# Patient Record
Sex: FEMALE | Race: WHITE | NOT HISPANIC OR LATINO
[De-identification: names, ages, dates, MRNs, and addresses within clinical notes are randomized per-mention and may not be internally consistent; named-entity substitution may affect disease eponyms.]

---

## 2017-07-18 ENCOUNTER — RESULT REVIEW (OUTPATIENT)
Age: 32
End: 2017-07-18

## 2018-12-06 ENCOUNTER — RESULT REVIEW (OUTPATIENT)
Age: 33
End: 2018-12-06

## 2019-01-08 ENCOUNTER — RESULT REVIEW (OUTPATIENT)
Age: 34
End: 2019-01-08

## 2019-01-17 ENCOUNTER — OUTPATIENT (OUTPATIENT)
Dept: OUTPATIENT SERVICES | Facility: HOSPITAL | Age: 34
LOS: 1 days | End: 2019-01-17

## 2019-01-17 VITALS
DIASTOLIC BLOOD PRESSURE: 80 MMHG | SYSTOLIC BLOOD PRESSURE: 100 MMHG | HEIGHT: 69 IN | RESPIRATION RATE: 16 BRPM | WEIGHT: 160.06 LBS | TEMPERATURE: 98 F | HEART RATE: 82 BPM

## 2019-01-17 DIAGNOSIS — R87.613 HIGH GRADE SQUAMOUS INTRAEPITHELIAL LESION ON CYTOLOGIC SMEAR OF CERVIX (HGSIL): ICD-10-CM

## 2019-01-17 DIAGNOSIS — Z98.890 OTHER SPECIFIED POSTPROCEDURAL STATES: Chronic | ICD-10-CM

## 2019-01-17 DIAGNOSIS — R87.619 UNSPECIFIED ABNORMAL CYTOLOGICAL FINDINGS IN SPECIMENS FROM CERVIX UTERI: ICD-10-CM

## 2019-01-17 LAB
HCG SERPL-ACNC: < 5 MIU/ML — SIGNIFICANT CHANGE UP
HCT VFR BLD CALC: 43.5 % — SIGNIFICANT CHANGE UP (ref 34.5–45)
HGB BLD-MCNC: 13.8 G/DL — SIGNIFICANT CHANGE UP (ref 11.5–15.5)
MCHC RBC-ENTMCNC: 30.3 PG — SIGNIFICANT CHANGE UP (ref 27–34)
MCHC RBC-ENTMCNC: 31.7 % — LOW (ref 32–36)
MCV RBC AUTO: 95.4 FL — SIGNIFICANT CHANGE UP (ref 80–100)
NRBC # FLD: 0 K/UL — LOW (ref 25–125)
PLATELET # BLD AUTO: 293 K/UL — SIGNIFICANT CHANGE UP (ref 150–400)
PMV BLD: 10.3 FL — SIGNIFICANT CHANGE UP (ref 7–13)
RBC # BLD: 4.56 M/UL — SIGNIFICANT CHANGE UP (ref 3.8–5.2)
RBC # FLD: 12.2 % — SIGNIFICANT CHANGE UP (ref 10.3–14.5)
WBC # BLD: 7.48 K/UL — SIGNIFICANT CHANGE UP (ref 3.8–10.5)
WBC # FLD AUTO: 7.48 K/UL — SIGNIFICANT CHANGE UP (ref 3.8–10.5)

## 2019-01-17 NOTE — H&P PST ADULT - NSANTHOSAYNRD_GEN_A_CORE
No. VICKI screening performed.  STOP BANG Legend: 0-2 = LOW Risk; 3-4 = INTERMEDIATE Risk; 5-8 = HIGH Risk

## 2019-01-17 NOTE — H&P PST ADULT - HISTORY OF PRESENT ILLNESS
Pt is a 33 y.o. female ; pt reports h/o abnormal pap  smear first noted at 17 years old. pt reports recent pap smear Pt is a 33 y.o. female ; pt reports recent pap smear " abnormal " Pt s/p Colposcopy ; pt now presents for Leep

## 2019-01-17 NOTE — H&P PST ADULT - PSH
(normal spontaneous vaginal delivery)  x2 3/02/11, 12  S/P D&C (status post dilation and curettage)  2010

## 2019-01-17 NOTE — H&P PST ADULT - PROBLEM SELECTOR PLAN 1
Leep    Pre op instructions including Pepcid reviewed with pt ; pt appears to have a good understanding of pre op instructions    Cup provided for UCG dos

## 2019-01-29 ENCOUNTER — TRANSCRIPTION ENCOUNTER (OUTPATIENT)
Age: 34
End: 2019-01-29

## 2019-01-30 ENCOUNTER — RESULT REVIEW (OUTPATIENT)
Age: 34
End: 2019-01-30

## 2019-01-30 ENCOUNTER — OUTPATIENT (OUTPATIENT)
Dept: OUTPATIENT SERVICES | Facility: HOSPITAL | Age: 34
LOS: 1 days | Discharge: HOME | End: 2019-01-30
Payer: COMMERCIAL

## 2019-01-30 VITALS
DIASTOLIC BLOOD PRESSURE: 69 MMHG | WEIGHT: 160.06 LBS | SYSTOLIC BLOOD PRESSURE: 102 MMHG | HEIGHT: 69 IN | RESPIRATION RATE: 16 BRPM | OXYGEN SATURATION: 98 % | HEART RATE: 70 BPM | TEMPERATURE: 98 F

## 2019-01-30 VITALS
SYSTOLIC BLOOD PRESSURE: 98 MMHG | RESPIRATION RATE: 19 BRPM | OXYGEN SATURATION: 98 % | HEART RATE: 55 BPM | DIASTOLIC BLOOD PRESSURE: 65 MMHG

## 2019-01-30 DIAGNOSIS — R87.613 HIGH GRADE SQUAMOUS INTRAEPITHELIAL LESION ON CYTOLOGIC SMEAR OF CERVIX (HGSIL): ICD-10-CM

## 2019-01-30 DIAGNOSIS — Z98.890 OTHER SPECIFIED POSTPROCEDURAL STATES: Chronic | ICD-10-CM

## 2019-01-30 LAB — HCG UR QL: NEGATIVE — SIGNIFICANT CHANGE UP

## 2019-01-30 PROCEDURE — 88307 TISSUE EXAM BY PATHOLOGIST: CPT | Mod: 26

## 2019-01-30 RX ORDER — SODIUM CHLORIDE 9 MG/ML
1000 INJECTION, SOLUTION INTRAVENOUS
Qty: 0 | Refills: 0 | Status: DISCONTINUED | OUTPATIENT
Start: 2019-01-30 | End: 2019-02-14

## 2019-01-30 NOTE — ASU DISCHARGE PLAN (ADULT/PEDIATRIC). - ACTIVITY LEVEL
no heavy lifting/1 week/no douching/no intercourse/nothing per rectum/nothing per vagina/no tub baths/no tampons

## 2019-01-30 NOTE — ASU DISCHARGE PLAN (ADULT/PEDIATRIC). - NURSING INSTRUCTIONS
You were given tyelnol/Motrin in the operating room, so you may not take any Tylenol /Motrin product until _____5: 30pm______

## 2019-02-05 LAB — SURGICAL PATHOLOGY STUDY: SIGNIFICANT CHANGE UP

## 2019-04-03 PROBLEM — R87.619 UNSPECIFIED ABNORMAL CYTOLOGICAL FINDINGS IN SPECIMENS FROM CERVIX UTERI: Chronic | Status: ACTIVE | Noted: 2019-01-17

## 2019-06-16 ENCOUNTER — TRANSCRIPTION ENCOUNTER (OUTPATIENT)
Age: 34
End: 2019-06-16

## 2019-07-19 ENCOUNTER — OUTPATIENT (OUTPATIENT)
Dept: OUTPATIENT SERVICES | Facility: HOSPITAL | Age: 34
LOS: 1 days | End: 2019-07-19

## 2019-07-19 VITALS
WEIGHT: 162.04 LBS | RESPIRATION RATE: 14 BRPM | HEART RATE: 81 BPM | OXYGEN SATURATION: 98 % | TEMPERATURE: 98 F | DIASTOLIC BLOOD PRESSURE: 68 MMHG | HEIGHT: 70 IN | SYSTOLIC BLOOD PRESSURE: 94 MMHG

## 2019-07-19 DIAGNOSIS — R87.613 HIGH GRADE SQUAMOUS INTRAEPITHELIAL LESION ON CYTOLOGIC SMEAR OF CERVIX (HGSIL): ICD-10-CM

## 2019-07-19 DIAGNOSIS — Z98.890 OTHER SPECIFIED POSTPROCEDURAL STATES: Chronic | ICD-10-CM

## 2019-07-19 LAB
HCG SERPL-ACNC: < 5 MIU/ML — SIGNIFICANT CHANGE UP
HCT VFR BLD CALC: 39.6 % — SIGNIFICANT CHANGE UP (ref 34.5–45)
HGB BLD-MCNC: 13 G/DL — SIGNIFICANT CHANGE UP (ref 11.5–15.5)
MCHC RBC-ENTMCNC: 31.4 PG — SIGNIFICANT CHANGE UP (ref 27–34)
MCHC RBC-ENTMCNC: 32.8 % — SIGNIFICANT CHANGE UP (ref 32–36)
MCV RBC AUTO: 95.7 FL — SIGNIFICANT CHANGE UP (ref 80–100)
NRBC # FLD: 0 K/UL — SIGNIFICANT CHANGE UP (ref 0–0)
PLATELET # BLD AUTO: 250 K/UL — SIGNIFICANT CHANGE UP (ref 150–400)
PMV BLD: 10.7 FL — SIGNIFICANT CHANGE UP (ref 7–13)
RBC # BLD: 4.14 M/UL — SIGNIFICANT CHANGE UP (ref 3.8–5.2)
RBC # FLD: 11.8 % — SIGNIFICANT CHANGE UP (ref 10.3–14.5)
WBC # BLD: 9.55 K/UL — SIGNIFICANT CHANGE UP (ref 3.8–10.5)
WBC # FLD AUTO: 9.55 K/UL — SIGNIFICANT CHANGE UP (ref 3.8–10.5)

## 2019-07-19 RX ORDER — SODIUM CHLORIDE 9 MG/ML
1000 INJECTION, SOLUTION INTRAVENOUS
Refills: 0 | Status: DISCONTINUED | OUTPATIENT
Start: 2019-07-26 | End: 2019-08-12

## 2019-07-19 NOTE — H&P PST ADULT - NEGATIVE CARDIOVASCULAR SYMPTOMS
no chest pain/no claudication/no palpitations/no paroxysmal nocturnal dyspnea/no peripheral edema/no dyspnea on exertion/no orthopnea

## 2019-07-19 NOTE — H&P PST ADULT - NS MD HP INPLANTS MED DEV
[facility] [facility] [place] [id number]  [phone]               Thank you for choosing us for your health care visit with Marjan Echevarria MD.  We are glad to serve you and happy to provide you with this summary of your visit.   Pl Right thyroid nodule    Upper respiratory tract infection, unspecified type          Instructions and Information about Your Health    1. Patient is to continue her current diet, medications and activity.   2.  Patient is to stop her Synthroid/levothyroxi 1 container   Sig-1 tablespoon in 8 oz of water in the AM.   What changed:    - when to take this  - reasons to take this  - additional instructions   Commonly known as:  METAMUCIL           Rosuvastatin Calcium 5 MG Tabs   TAKE 1 TABLET DAILY   Commonly k Intrauterine Device

## 2019-07-19 NOTE — H&P PST ADULT - ATTEMPT TO OOB
Attempted to call report, unable to receive at this time
Attempted to call report, unable to receive at this time
Bedside and Verbal shift change report given to Niru (oncoming nurse) by Melisa Nieto (offgoing nurse). Report included the following information SBAR and ED Summary. Introduced self to patient as primary nurse and assumed care. Plan of care and timeline explained. Pt made aware that it is their responsibility to excuse family or visitors if they do not want their medical information discussed.
Blood bank called, due to antibodies, pt's blood must be sent from [facility].
With the asistance of Radhika GLASGOW and Rocio MUNROE, the pt was cleaned up after a BM. The pt was cleaned and the lines were changed. The pt is having black, tar colored stools.
no

## 2019-07-19 NOTE — H&P PST ADULT - NEGATIVE GENERAL GENITOURINARY SYMPTOMS
no bladder infections/no hematuria/normal urinary frequency/no flank pain R/no flank pain L/no dysuria

## 2019-07-19 NOTE — H&P PST ADULT - NEGATIVE GENERAL SYMPTOMS
no chills/no malaise/no fatigue/no weight loss/no polyphagia/no weight gain/no polyuria/no sweating/no anorexia/no polydipsia/no fever

## 2019-07-19 NOTE — H&P PST ADULT - GASTROINTESTINAL DETAILS
bowel sounds normal/soft/nontender no rebound tenderness/no rigidity/bowel sounds normal/nontender/soft/no masses palpable/no guarding/no organomegaly/no bruit/no distention

## 2019-07-19 NOTE — H&P PST ADULT - RS GEN PE MLT RESP DETAILS PC
respirations non-labored/breath sounds equal/good air movement/airway patent/clear to auscultation bilaterally no chest wall tenderness/good air movement/no wheezes/clear to auscultation bilaterally/breath sounds equal/respirations non-labored/no rhonchi/no intercostal retractions/no rales

## 2019-07-19 NOTE — H&P PST ADULT - HISTORY OF PRESENT ILLNESS
33y/o female scheduled for LEEP procedure on 7/26/2019.  Pt states, "s/p LEEP procedure 1/2019, f/u pap abnormal,  continues to have abnormal cells."

## 2019-07-19 NOTE — H&P PST ADULT - NSICDXPROBLEM_GEN_ALL_CORE_FT
PROBLEM DIAGNOSES  Problem: HGSIL on Pap smear of cervix  Assessment and Plan: Pt scheduled for LEEP procedure on 7/26/2019.  labs done results pending, urine cup provided.  Preop teaching done, pt able to verbalize understanding.

## 2019-07-19 NOTE — H&P PST ADULT - NSICDXFAMILYHX_GEN_ALL_CORE_FT
FAMILY HISTORY:  Grandparent  Still living? No  Family history of leukemia, Age at diagnosis: Age Unknown

## 2019-07-19 NOTE — H&P PST ADULT - NSICDXPASTSURGICALHX_GEN_ALL_CORE_FT
PAST SURGICAL HISTORY:   (normal spontaneous vaginal delivery) x2 3/02/11, 12    S/P D&C (status post dilation and curettage)     S/P LEEP 2019

## 2019-07-25 ENCOUNTER — TRANSCRIPTION ENCOUNTER (OUTPATIENT)
Age: 34
End: 2019-07-25

## 2019-07-26 ENCOUNTER — RESULT REVIEW (OUTPATIENT)
Age: 34
End: 2019-07-26

## 2019-07-26 ENCOUNTER — OUTPATIENT (OUTPATIENT)
Dept: OUTPATIENT SERVICES | Facility: HOSPITAL | Age: 34
LOS: 1 days | Discharge: HOME | End: 2019-07-26
Payer: COMMERCIAL

## 2019-07-26 VITALS
OXYGEN SATURATION: 100 % | RESPIRATION RATE: 15 BRPM | DIASTOLIC BLOOD PRESSURE: 61 MMHG | HEART RATE: 79 BPM | SYSTOLIC BLOOD PRESSURE: 100 MMHG

## 2019-07-26 VITALS
SYSTOLIC BLOOD PRESSURE: 102 MMHG | HEART RATE: 62 BPM | TEMPERATURE: 98 F | DIASTOLIC BLOOD PRESSURE: 61 MMHG | RESPIRATION RATE: 18 BRPM | WEIGHT: 162.04 LBS | OXYGEN SATURATION: 99 % | HEIGHT: 70 IN

## 2019-07-26 DIAGNOSIS — Z98.890 OTHER SPECIFIED POSTPROCEDURAL STATES: Chronic | ICD-10-CM

## 2019-07-26 DIAGNOSIS — R87.613 HIGH GRADE SQUAMOUS INTRAEPITHELIAL LESION ON CYTOLOGIC SMEAR OF CERVIX (HGSIL): ICD-10-CM

## 2019-07-26 LAB — HCG UR QL: NEGATIVE — SIGNIFICANT CHANGE UP

## 2019-07-26 PROCEDURE — 88307 TISSUE EXAM BY PATHOLOGIST: CPT | Mod: 26

## 2019-07-26 PROCEDURE — 88360 TUMOR IMMUNOHISTOCHEM/MANUAL: CPT | Mod: 26

## 2019-07-26 PROCEDURE — 88342 IMHCHEM/IMCYTCHM 1ST ANTB: CPT | Mod: 26,59

## 2019-07-26 RX ORDER — OXYCODONE HYDROCHLORIDE 5 MG/1
5 TABLET ORAL ONCE
Refills: 0 | Status: DISCONTINUED | OUTPATIENT
Start: 2019-07-26 | End: 2019-07-26

## 2019-07-26 RX ORDER — KETOROLAC TROMETHAMINE 30 MG/ML
30 SYRINGE (ML) INJECTION ONCE
Refills: 0 | Status: DISCONTINUED | OUTPATIENT
Start: 2019-07-26 | End: 2019-07-26

## 2019-07-26 RX ORDER — ONDANSETRON 8 MG/1
4 TABLET, FILM COATED ORAL ONCE
Refills: 0 | Status: DISCONTINUED | OUTPATIENT
Start: 2019-07-26 | End: 2019-08-12

## 2019-07-26 RX ORDER — FENTANYL CITRATE 50 UG/ML
25 INJECTION INTRAVENOUS
Refills: 0 | Status: DISCONTINUED | OUTPATIENT
Start: 2019-07-26 | End: 2019-07-26

## 2019-07-26 RX ORDER — METOCLOPRAMIDE HCL 10 MG
10 TABLET ORAL ONCE
Refills: 0 | Status: DISCONTINUED | OUTPATIENT
Start: 2019-07-26 | End: 2019-08-12

## 2019-07-26 RX ADMIN — OXYCODONE HYDROCHLORIDE 5 MILLIGRAM(S): 5 TABLET ORAL at 09:30

## 2019-07-26 RX ADMIN — OXYCODONE HYDROCHLORIDE 5 MILLIGRAM(S): 5 TABLET ORAL at 10:00

## 2019-07-26 RX ADMIN — Medication 30 MILLIGRAM(S): at 09:00

## 2019-07-26 RX ADMIN — FENTANYL CITRATE 25 MICROGRAM(S): 50 INJECTION INTRAVENOUS at 08:45

## 2019-07-26 RX ADMIN — FENTANYL CITRATE 25 MICROGRAM(S): 50 INJECTION INTRAVENOUS at 08:30

## 2019-07-26 RX ADMIN — Medication 30 MILLIGRAM(S): at 08:45

## 2019-07-26 NOTE — ASU DISCHARGE PLAN (ADULT/PEDIATRIC) - CALL YOUR DOCTOR IF YOU HAVE ANY OF THE FOLLOWING:
Nausea and vomiting that does not stop/Unable to urinate/Excessive diarrhea/Fever greater than (need to indicate Fahrenheit or Celsius)/Wound/Surgical Site with redness, or foul smelling discharge or pus/Inability to tolerate liquids or foods/Increased irritability or sluggishness/Numbness, tingling, color or temperature change to extremity Nausea and vomiting that does not stop/Excessive diarrhea/Inability to tolerate liquids or foods/Increased irritability or sluggishness/Fever greater than (need to indicate Fahrenheit or Celsius)/Bleeding that does not stop/Unable to urinate/Wound/Surgical Site with redness, or foul smelling discharge or pus

## 2019-07-26 NOTE — ASU DISCHARGE PLAN (ADULT/PEDIATRIC) - FOLLOW UP APPOINTMENTS
911 or go to the nearest Emergency Room may also call Recovery Room (PACU) 24/7 @ [phone]/[facility]:

## 2019-07-26 NOTE — BRIEF OPERATIVE NOTE - NSICDXBRIEFPOSTOP_GEN_ALL_CORE_FT
POST-OP DIAGNOSIS:  High grade squamous intraepithelial lesion of cervix 26-Jul-2019 08:19:18  Cristhian Kennedy

## 2019-07-26 NOTE — BRIEF OPERATIVE NOTE - NSICDXBRIEFPREOP_GEN_ALL_CORE_FT
PRE-OP DIAGNOSIS:  High grade squamous intraepithelial lesion of cervix 26-Jul-2019 08:19:01  Cristhian Kennedy

## 2019-07-26 NOTE — ASU DISCHARGE PLAN (ADULT/PEDIATRIC) - MEDICATION INSTRUCTIONS
Take Ibuprofen 600mg every 6 hours and/or Tylenol 975mg every 6 hours You received IV Tylenol for pain management at 7:50 AM. Please DO NOT take any Tylenol (Acetaminophen) containing products, such as Vicodin, Percocet, Excedrin, and cold medications for the next 6 hours (until 2:50 PM). DO NOT TAKE MORE THAN 3000 MG OF TYLENOL in a 24 hour period. You received IV Tylenol for pain management at 7:50 AM. Please DO NOT take any Tylenol (Acetaminophen) containing products, such as Vicodin, Percocet, Excedrin, and cold medications for the next 6 hours (until 1:50 PM). DO NOT TAKE MORE THAN 3000 MG OF TYLENOL in a 24 hour period. ****************** You received IV Toradol for pain management at 8:45 AM. Please DO NOT take Motrin/Ibuprofen/Advil/Aleve/NSAIDs (Non-Steroidal Anti-Inflammatory Drugs) for the next 6 hours (until 2:45 PM).

## 2019-07-26 NOTE — ASU DISCHARGE PLAN (ADULT/PEDIATRIC) - PAIN MANAGEMENT
Take over the counter pain medication Take Ibuprofen 600mg every 6 hours and/or Tylenol 975mg every 6 hours/Take over the counter pain medication

## 2019-07-26 NOTE — ASU DISCHARGE PLAN (ADULT/PEDIATRIC) - CARE PROVIDER_API CALL
Luis Manuel Rachel)  Obstetrics and Gynecology  [address] [facility], [place] [address]  Phone: [phone]  Fax: [phone]  Follow Up Time: 2 weeks

## 2019-08-02 LAB — SURGICAL PATHOLOGY STUDY: SIGNIFICANT CHANGE UP

## 2019-12-12 ENCOUNTER — OUTPATIENT (OUTPATIENT)
Dept: OUTPATIENT SERVICES | Facility: HOSPITAL | Age: 34
LOS: 1 days | End: 2019-12-12

## 2019-12-12 VITALS
SYSTOLIC BLOOD PRESSURE: 110 MMHG | WEIGHT: 162.04 LBS | HEIGHT: 70 IN | HEART RATE: 84 BPM | TEMPERATURE: 98 F | OXYGEN SATURATION: 99 % | RESPIRATION RATE: 16 BRPM | DIASTOLIC BLOOD PRESSURE: 80 MMHG

## 2019-12-12 DIAGNOSIS — Z98.890 OTHER SPECIFIED POSTPROCEDURAL STATES: Chronic | ICD-10-CM

## 2019-12-12 DIAGNOSIS — R87.613 HIGH GRADE SQUAMOUS INTRAEPITHELIAL LESION ON CYTOLOGIC SMEAR OF CERVIX (HGSIL): ICD-10-CM

## 2019-12-12 LAB
BLD GP AB SCN SERPL QL: NEGATIVE — SIGNIFICANT CHANGE UP
HCT VFR BLD CALC: 42.8 % — SIGNIFICANT CHANGE UP (ref 34.5–45)
HGB BLD-MCNC: 13.4 G/DL — SIGNIFICANT CHANGE UP (ref 11.5–15.5)
MCHC RBC-ENTMCNC: 30.7 PG — SIGNIFICANT CHANGE UP (ref 27–34)
MCHC RBC-ENTMCNC: 31.3 % — LOW (ref 32–36)
MCV RBC AUTO: 98.2 FL — SIGNIFICANT CHANGE UP (ref 80–100)
NRBC # FLD: 0 K/UL — SIGNIFICANT CHANGE UP (ref 0–0)
PLATELET # BLD AUTO: 290 K/UL — SIGNIFICANT CHANGE UP (ref 150–400)
PMV BLD: 11 FL — SIGNIFICANT CHANGE UP (ref 7–13)
RBC # BLD: 4.36 M/UL — SIGNIFICANT CHANGE UP (ref 3.8–5.2)
RBC # FLD: 11.9 % — SIGNIFICANT CHANGE UP (ref 10.3–14.5)
RH IG SCN BLD-IMP: POSITIVE — SIGNIFICANT CHANGE UP
WBC # BLD: 8.89 K/UL — SIGNIFICANT CHANGE UP (ref 3.8–10.5)
WBC # FLD AUTO: 8.89 K/UL — SIGNIFICANT CHANGE UP (ref 3.8–10.5)

## 2019-12-12 RX ORDER — SODIUM CHLORIDE 9 MG/ML
1000 INJECTION, SOLUTION INTRAVENOUS
Refills: 0 | Status: DISCONTINUED | OUTPATIENT
Start: 2019-12-20 | End: 2020-01-05

## 2019-12-12 NOTE — H&P PST ADULT - NEGATIVE ENMT SYMPTOMS
no sinus symptoms/no hearing difficulty/no nasal congestion/no post-nasal discharge/no throat pain/no dysphagia

## 2019-12-12 NOTE — H&P PST ADULT - NSICDXPROBLEM_GEN_ALL_CORE_FT
PROBLEM DIAGNOSES  Problem: HGSIL on cytologic smear of cervix  Assessment and Plan: pt scheduled for laparoscoic assisted vaginal hysterectomy on 12/20/19  Preop instructions provided. Pt verbalized understanding.   Pepcid for GI prophylaxis with written and verbal instruction provided    written and verbal instructions with teach back on chlorhexidine shampoo provided,  pt verbalized understanding with returned demonstration

## 2019-12-12 NOTE — H&P PST ADULT - NEGATIVE GENERAL GENITOURINARY SYMPTOMS
no renal colic/no bladder infections/no dysuria/no hematuria/no flank pain R/no incontinence/no flank pain L

## 2019-12-12 NOTE — H&P PST ADULT - ASSESSMENT
34 y.o. female with preop diagnosis of high grade squamous intraepithelial lesion on cytologic smear of cervix

## 2019-12-12 NOTE — H&P PST ADULT - NSICDXPASTMEDICALHX_GEN_ALL_CORE_FT
PAST MEDICAL HISTORY:  Abnormal Pap smear of cervix     High grade squamous intraepithelial lesion on cytologic smear of cervix (HGSIL)

## 2019-12-12 NOTE — H&P PST ADULT - HISTORY OF PRESENT ILLNESS
34 y.o .female , LMP 19, hx of high grade squamous intraepithelial lesion on cytologic smear of cervix, scheduled for Laparoscopic Assisted Vaginal Hysterectomy on 19, pt denies abnormal vaginal bleeding, pelvic pain, weight loss or fatigue

## 2019-12-19 ENCOUNTER — TRANSCRIPTION ENCOUNTER (OUTPATIENT)
Age: 34
End: 2019-12-19

## 2019-12-20 ENCOUNTER — OUTPATIENT (OUTPATIENT)
Dept: OUTPATIENT SERVICES | Facility: HOSPITAL | Age: 34
LOS: 1 days | Discharge: HOME | End: 2019-12-20
Payer: COMMERCIAL

## 2019-12-20 ENCOUNTER — RESULT REVIEW (OUTPATIENT)
Age: 34
End: 2019-12-20

## 2019-12-20 VITALS
WEIGHT: 162.04 LBS | DIASTOLIC BLOOD PRESSURE: 92 MMHG | TEMPERATURE: 98 F | OXYGEN SATURATION: 95 % | RESPIRATION RATE: 16 BRPM | HEART RATE: 88 BPM | SYSTOLIC BLOOD PRESSURE: 123 MMHG | HEIGHT: 70 IN

## 2019-12-20 VITALS
DIASTOLIC BLOOD PRESSURE: 58 MMHG | SYSTOLIC BLOOD PRESSURE: 99 MMHG | HEART RATE: 76 BPM | OXYGEN SATURATION: 100 % | RESPIRATION RATE: 16 BRPM

## 2019-12-20 DIAGNOSIS — R87.613 HIGH GRADE SQUAMOUS INTRAEPITHELIAL LESION ON CYTOLOGIC SMEAR OF CERVIX (HGSIL): ICD-10-CM

## 2019-12-20 DIAGNOSIS — Z98.890 OTHER SPECIFIED POSTPROCEDURAL STATES: Chronic | ICD-10-CM

## 2019-12-20 LAB — HCG UR QL: NEGATIVE — SIGNIFICANT CHANGE UP

## 2019-12-20 PROCEDURE — 88307 TISSUE EXAM BY PATHOLOGIST: CPT | Mod: 26

## 2019-12-20 RX ORDER — KETOROLAC TROMETHAMINE 30 MG/ML
30 SYRINGE (ML) INJECTION ONCE
Refills: 0 | Status: DISCONTINUED | OUTPATIENT
Start: 2019-12-20 | End: 2019-12-20

## 2019-12-20 RX ORDER — CITRIC ACID/SODIUM CITRATE 300-500 MG
30 SOLUTION, ORAL ORAL ONCE
Refills: 0 | Status: COMPLETED | OUTPATIENT
Start: 2019-12-20 | End: 2019-12-20

## 2019-12-20 RX ORDER — ACETAMINOPHEN 500 MG
1000 TABLET ORAL ONCE
Refills: 0 | Status: COMPLETED | OUTPATIENT
Start: 2019-12-20 | End: 2019-12-20

## 2019-12-20 RX ORDER — OXYCODONE HYDROCHLORIDE 5 MG/1
1 TABLET ORAL
Qty: 10 | Refills: 0
Start: 2019-12-20

## 2019-12-20 RX ORDER — IBUPROFEN 200 MG
600 TABLET ORAL ONCE
Refills: 0 | Status: DISCONTINUED | OUTPATIENT
Start: 2019-12-20 | End: 2019-12-20

## 2019-12-20 RX ORDER — ACETAMINOPHEN 500 MG
2 TABLET ORAL
Qty: 0 | Refills: 0 | DISCHARGE

## 2019-12-20 RX ORDER — IBUPROFEN 200 MG
1 TABLET ORAL
Qty: 0 | Refills: 0 | DISCHARGE

## 2019-12-20 RX ORDER — ONDANSETRON 8 MG/1
4 TABLET, FILM COATED ORAL ONCE
Refills: 0 | Status: COMPLETED | OUTPATIENT
Start: 2019-12-20 | End: 2019-12-20

## 2019-12-20 RX ORDER — HYDROMORPHONE HYDROCHLORIDE 2 MG/ML
1 INJECTION INTRAMUSCULAR; INTRAVENOUS; SUBCUTANEOUS
Refills: 0 | Status: DISCONTINUED | OUTPATIENT
Start: 2019-12-20 | End: 2019-12-20

## 2019-12-20 RX ORDER — SODIUM CHLORIDE 9 MG/ML
1000 INJECTION, SOLUTION INTRAVENOUS
Refills: 0 | Status: DISCONTINUED | OUTPATIENT
Start: 2019-12-20 | End: 2020-01-05

## 2019-12-20 RX ORDER — SODIUM CHLORIDE 9 MG/ML
1000 INJECTION, SOLUTION INTRAVENOUS ONCE
Refills: 0 | Status: COMPLETED | OUTPATIENT
Start: 2019-12-20 | End: 2019-12-20

## 2019-12-20 RX ORDER — OXYCODONE HYDROCHLORIDE 5 MG/1
5 TABLET ORAL ONCE
Refills: 0 | Status: DISCONTINUED | OUTPATIENT
Start: 2019-12-20 | End: 2019-12-20

## 2019-12-20 RX ORDER — SIMETHICONE 80 MG/1
80 TABLET, CHEWABLE ORAL ONCE
Refills: 0 | Status: COMPLETED | OUTPATIENT
Start: 2019-12-20 | End: 2019-12-20

## 2019-12-20 RX ADMIN — Medication 30 MILLILITER(S): at 18:23

## 2019-12-20 RX ADMIN — HYDROMORPHONE HYDROCHLORIDE 1 MILLIGRAM(S): 2 INJECTION INTRAMUSCULAR; INTRAVENOUS; SUBCUTANEOUS at 11:25

## 2019-12-20 RX ADMIN — Medication 30 MILLIGRAM(S): at 19:25

## 2019-12-20 RX ADMIN — OXYCODONE HYDROCHLORIDE 5 MILLIGRAM(S): 5 TABLET ORAL at 15:00

## 2019-12-20 RX ADMIN — Medication 30 MILLIGRAM(S): at 16:26

## 2019-12-20 RX ADMIN — Medication 400 MILLIGRAM(S): at 21:40

## 2019-12-20 RX ADMIN — SODIUM CHLORIDE 1000 MILLILITER(S): 9 INJECTION, SOLUTION INTRAVENOUS at 18:01

## 2019-12-20 RX ADMIN — HYDROMORPHONE HYDROCHLORIDE 1 MILLIGRAM(S): 2 INJECTION INTRAMUSCULAR; INTRAVENOUS; SUBCUTANEOUS at 11:40

## 2019-12-20 RX ADMIN — SODIUM CHLORIDE 125 MILLILITER(S): 9 INJECTION, SOLUTION INTRAVENOUS at 11:36

## 2019-12-20 RX ADMIN — Medication 30 MILLIGRAM(S): at 19:52

## 2019-12-20 RX ADMIN — OXYCODONE HYDROCHLORIDE 5 MILLIGRAM(S): 5 TABLET ORAL at 14:49

## 2019-12-20 RX ADMIN — Medication 1000 MILLIGRAM(S): at 22:05

## 2019-12-20 RX ADMIN — SIMETHICONE 80 MILLIGRAM(S): 80 TABLET, CHEWABLE ORAL at 21:01

## 2019-12-20 RX ADMIN — HYDROMORPHONE HYDROCHLORIDE 1 MILLIGRAM(S): 2 INJECTION INTRAMUSCULAR; INTRAVENOUS; SUBCUTANEOUS at 12:00

## 2019-12-20 RX ADMIN — ONDANSETRON 4 MILLIGRAM(S): 8 TABLET, FILM COATED ORAL at 14:49

## 2019-12-20 NOTE — ASU DISCHARGE PLAN (ADULT/PEDIATRIC) - NURSING INSTRUCTIONS
You were given intravenous TYLENOL for pain management at _0910am_. Please DO NOT take any products containing TYLENOL or ACETAMINOPHEN, such as VICODIN, PERCOCET, EXCEDRIN, and any over-the-counter cold medication for the next 6 hours (until ____3:10pm). DO NOT TAKE MORE THAN 3000 MG OF TYLENOL in a 24 hour period.  You were given intravenous TORADOL for pain management at ______10:30am. Please DO NOT take ibuprofen containing products such as MOTRIN or ADVIL, or any other NSAIDs (Non-Steroidal Anti-Inflammatory Drugs) such as ALEVE for the next 6 hours (until ___4:30pm). You were given intravenous TYLENOL for pain management at _9:45pm_. Please DO NOT take any products containing TYLENOL or ACETAMINOPHEN, such as VICODIN, PERCOCET, EXCEDRIN, and any over-the-counter cold medication for the next 6 hours (until 3:45 am). DO NOT TAKE MORE THAN 3000 MG OF TYLENOL in a 24 hour period.  DO NOT take any Ibuprofen ,Advil or Aleeve until after 1am . You received Toradol during your operation and it can cause damage if too much is taken within a 24 hour time period.    You were given intravenous TORADOL for pain management at ______10:30am. Please DO NOT take ibuprofen containing products such as MOTRIN or ADVIL, or any other NSAIDs (Non-Steroidal Anti-Inflammatory Drugs) such as ALEVE for the next 6 hours (until ___4:30pm).

## 2019-12-20 NOTE — CHART NOTE - NSCHARTNOTEFT_GEN_A_CORE
R1 Post Op check      S: Patient seen and examined at bedside. Reporting moderate abdominal pain 5/10 with pain medication. Rivera and vaginal packing removed. Minimal bleeding on packing. 400 UOP in rivera bag.     Vital Signs Last 24 Hrs  T(C): 36.7 (20 Dec 2019 11:05), Max: 36.7 (20 Dec 2019 11:05)  T(F): 98.1 (20 Dec 2019 11:05), Max: 98.1 (20 Dec 2019 11:05)  HR: 71 (20 Dec 2019 13:00) (55 - 88)  BP: 105/66 (20 Dec 2019 13:00) (97/61 - 123/92)  BP(mean): --  RR: 11 (20 Dec 2019 13:00) (10 - 18)  SpO2: 96% (20 Dec 2019 13:00) (95% - 99%)    Gen: aaox3, nad, resting comfortably   Abd: soft, appropriate tenderness to palpation, no guarding, no rebound  Vagina: no blood in vault  Ext: nonedematous, nonerythematous    A/P:   - continue current post op care  - DTV @ 9pm -> then home  - Clinically stable    ADomney PGY1

## 2019-12-20 NOTE — BRIEF OPERATIVE NOTE - NSICDXBRIEFPREOP_GEN_ALL_CORE_FT
PRE-OP DIAGNOSIS:  LUDWIG II (cervical intraepithelial neoplasia II) 20-Dec-2019 19:09:48  Yovani Roland

## 2019-12-20 NOTE — BRIEF OPERATIVE NOTE - NSICDXBRIEFPOSTOP_GEN_ALL_CORE_FT
POST-OP DIAGNOSIS:  LUDWIG II (cervical intraepithelial neoplasia II) 20-Dec-2019 19:09:54  Yovani Roland

## 2019-12-20 NOTE — ASU DISCHARGE PLAN (ADULT/PEDIATRIC) - CARE PROVIDER_API CALL
Luis Manuel Rachel)  Obstetrics and Gynecology  [address] [facility], [place] [address]  Phone: [phone]  Fax: [phone]  Follow Up Time:

## 2019-12-20 NOTE — ASU DISCHARGE PLAN (ADULT/PEDIATRIC) - ACTIVITY LEVEL
No excercise/Nothing per vagina/No tub baths/No heavy lifting/Nothing per rectum/No intercourse/No douching/No tampons

## 2019-12-20 NOTE — ASU PATIENT PROFILE, ADULT - PMH
Abnormal Pap smear of cervix    High grade squamous intraepithelial lesion on cytologic smear of cervix (HGSIL)

## 2019-12-20 NOTE — ASU DISCHARGE PLAN (ADULT/PEDIATRIC) - ASU DC SPECIAL INSTRUCTIONSFT
Expect abdominal cramping/pain and spotting for the next weeks. Take ibuprofen and Tylenol for cramping. Use a pad as needed. Call your physician or go to the emergency room if you experience any of the following: heavy vaginal bleeding (soaking more than 2 pads in 1 hour for 2 hours), fever, chills, nausea, vomiting, or pain that is not controlled by medication. Follow-up with Dr. Rachel in 2 weeks.

## 2019-12-20 NOTE — BRIEF OPERATIVE NOTE - NSICDXBRIEFPROCEDURE_GEN_ALL_CORE_FT
PROCEDURES:  Salpingectomy, bilateral 20-Dec-2019 19:11:05  Yovani Roland  Sepulveda culdoplasty 20-Dec-2019 19:09:28  Yovani Roland  Laparoscopic assisted vaginal hysterectomy for uterus 250 grams or less 20-Dec-2019 19:09:21  Yovani Roland

## 2019-12-20 NOTE — ASU DISCHARGE PLAN (ADULT/PEDIATRIC) - CALL YOUR DOCTOR IF YOU HAVE ANY OF THE FOLLOWING:
Pain not relieved by Medications/Fever greater than (need to indicate Fahrenheit or Celsius)/Nausea and vomiting that does not stop/Unable to urinate/Inability to tolerate liquids or foods/Bleeding that does not stop/Wound/Surgical Site with redness, or foul smelling discharge or pus

## 2019-12-20 NOTE — ASU PATIENT PROFILE, ADULT - PSH
(normal spontaneous vaginal delivery)  x2 3/02/11, 12  S/P D&C (status post dilation and curettage)    S/P LEEP  2019

## 2019-12-30 LAB — SURGICAL PATHOLOGY STUDY: SIGNIFICANT CHANGE UP

## 2020-09-10 ENCOUNTER — RESULT REVIEW (OUTPATIENT)
Age: 35
End: 2020-09-10

## 2021-05-09 ENCOUNTER — RESULT REVIEW (OUTPATIENT)
Age: 36
End: 2021-05-09

## 2021-07-27 NOTE — H&P PST ADULT - NSANTHBMIRD_ENT_A_CORE
CM called [facility] and left a vm for [employer] in Admissions  [facility] left a vm for [employer] in Admissions  CM called [facility] and left a vm for Tasha Traphill in Admissions  CM called [facility] and spoke with Susan whom reported that they do not have a bed available  CM called [facility] and left vm for [employer]  CM called [facility] and spoke with Jacklyn Fleming whom reported that they do not participate with MA so they are unable to accept  CM called [facility] and spoke with Jarrett Galeas whom will check with her supervisor, Ann Marie Lujan regarding determination  CM called [facility] and spoke with Admissions and there is not a bed available  No

## 2022-03-08 NOTE — H&P PST ADULT - NS PRO FEM REPRO HEALTH SCREEN
03/08/22 1200   Daily Treatment   Symptoms Review Activity Group 0900 - 1000   Affect Appropriate   Mood Anxious   Thought Process Appropriate   Psychosis None   Symptom Notation Patient denied any safety concerns at this time.  She reported having 3 hours of solid sleep with difficulty falling asleep last night.  Slight decrease in appetite.  Anxiety has decreased, which patient believes is due to her medication helping, talking to herself \"more realistically, facing what's going on rather than being in my head, and seeing things more clearly.\"  Patient noted she has been working on challenging her thoughts, which she has found helpful.   Psychosocial Stressors Interpersonal conflict   Sleep Report Poor;Difficulty falling asleep   Hours Slept 3 hours of solid sleep with difficulty falling asleep last night   Meals Breakfast;Lunch;Dinner   Goal Complete / Activity Patient met her goals to shower, to eat dinner, to spend time with her mom, to practice mindful meditation, to practicing breathing techniques, and to practice the 5-4-3-2-1 senses skill.  She did not meet her goal to do a face mask.   Treatment Plan Continue treatment plan   Patient Response Appropriate feedback;Attentive;Good eye contact;Interactive   Comment Patient was cooperative and focused during the check in.        breast self-exam

## 2022-05-17 NOTE — H&P PST ADULT - BREASTS
Problem: Potential for Falls  Goal: Patient will remain free of falls  Description: INTERVENTIONS:  - Educate patient/family on patient safety including physical limitations  - Instruct patient to call for assistance with activity   - Consult OT/PT to assist with strengthening/mobility   - Keep Call bell within reach  - Keep bed low and locked with side rails adjusted as appropriate  - Keep care items and personal belongings within reach  - Initiate and maintain comfort rounds  - Make Fall Risk Sign visible to staff  - Offer Toileting every Hours, in advance of need  - Initiate/Maintain alarm  - Obtain necessary fall risk management equipment:   - Apply yellow socks and bracelet for high fall risk patients  - Consider moving patient to room near nurses station  Outcome: Progressing     Problem: PAIN - ADULT  Goal: Verbalizes/displays adequate comfort level or baseline comfort level  Description: Interventions:  - Encourage patient to monitor pain and request assistance  - Assess pain using appropriate pain scale  - Administer analgesics based on type and severity of pain and evaluate response  - Implement non-pharmacological measures as appropriate and evaluate response  - Consider cultural and social influences on pain and pain management  - Notify physician/advanced practitioner if interventions unsuccessful or patient reports new pain  Outcome: Progressing     Problem: INFECTION - ADULT  Goal: Absence or prevention of progression during hospitalization  Description: INTERVENTIONS:  - Assess and monitor for signs and symptoms of infection  - Monitor lab/diagnostic results  - Monitor all insertion sites, i e  indwelling lines, tubes, and drains  - Monitor endotracheal if appropriate and nasal secretions for changes in amount and color  - Jolon appropriate cooling/warming therapies per order  - Administer medications as ordered  - Instruct and encourage patient and family to use good hand hygiene technique  - Identify and instruct in appropriate isolation precautions for identified infection/condition  Outcome: Progressing  Goal: Absence of fever/infection during neutropenic period  Description: INTERVENTIONS:  - Monitor WBC    Outcome: Progressing     Problem: SAFETY ADULT  Goal: Patient will remain free of falls  Description: INTERVENTIONS:  - Educate patient/family on patient safety including physical limitations  - Instruct patient to call for assistance with activity   - Consult OT/PT to assist with strengthening/mobility   - Keep Call bell within reach  - Keep bed low and locked with side rails adjusted as appropriate  - Keep care items and personal belongings within reach  - Initiate and maintain comfort rounds  - Make Fall Risk Sign visible to staff  - Offer Toileting every  Hours, in advance of need  - Initiate/Maintain alarm  - Obtain necessary fall risk management equipment:   - Apply yellow socks and bracelet for high fall risk patients  - Consider moving patient to room near nurses station  Outcome: Progressing  Goal: Maintain or return to baseline ADL function  Description: INTERVENTIONS:  -  Assess patient's ability to carry out ADLs; assess patient's baseline for ADL function and identify physical deficits which impact ability to perform ADLs (bathing, care of mouth/teeth, toileting, grooming, dressing, etc )  - Assess/evaluate cause of self-care deficits   - Assess range of motion  - Assess patient's mobility; develop plan if impaired  - Assess patient's need for assistive devices and provide as appropriate  - Encourage maximum independence but intervene and supervise when necessary  - Involve family in performance of ADLs  - Assess for home care needs following discharge   - Consider OT consult to assist with ADL evaluation and planning for discharge  - Provide patient education as appropriate  Outcome: Progressing  Goal: Maintains/Returns to pre admission functional level  Description: INTERVENTIONS:  - Perform BMAT or MOVE assessment daily    - Set and communicate daily mobility goal to care team and patient/family/caregiver  - Collaborate with rehabilitation services on mobility goals if consulted  - Perform Range of Motion times a day  - Reposition patient every  hours  - Dangle patient  times a day  - Stand patient  times a day  - Ambulate patient times a day  - Out of bed to chair  times a day   - Out of bed for meals times a day  - Out of bed for toileting  - Record patient progress and toleration of activity level   Outcome: Progressing     Problem: DISCHARGE PLANNING  Goal: Discharge to home or other facility with appropriate resources  Description: INTERVENTIONS:  - Identify barriers to discharge w/patient and caregiver  - Arrange for needed discharge resources and transportation as appropriate  - Identify discharge learning needs (meds, wound care, etc )  - Arrange for interpretive services to assist at discharge as needed  - Refer to Case Management Department for coordinating discharge planning if the patient needs post-hospital services based on physician/advanced practitioner order or complex needs related to functional status, cognitive ability, or social support system  Outcome: Progressing     Problem: Knowledge Deficit  Goal: Patient/family/caregiver demonstrates understanding of disease process, treatment plan, medications, and discharge instructions  Description: Complete learning assessment and assess knowledge base    Interventions:  - Provide teaching at level of understanding  - Provide teaching via preferred learning methods  Outcome: Progressing No masses; no nipple discharge

## 2022-07-06 PROBLEM — R87.613 HIGH GRADE SQUAMOUS INTRAEPITHELIAL LESION ON CYTOLOGIC SMEAR OF CERVIX (HGSIL): Chronic | Status: ACTIVE | Noted: 2019-12-12

## 2022-07-28 ENCOUNTER — APPOINTMENT (OUTPATIENT)
Dept: OBGYN | Facility: CLINIC | Age: 37
End: 2022-07-28

## 2022-07-28 ENCOUNTER — RESULT REVIEW (OUTPATIENT)
Age: 37
End: 2022-07-28

## 2022-07-28 PROCEDURE — 76830 TRANSVAGINAL US NON-OB: CPT

## 2022-07-28 PROCEDURE — 76856 US EXAM PELVIC COMPLETE: CPT | Mod: 59

## 2022-07-28 PROCEDURE — 81002 URINALYSIS NONAUTO W/O SCOPE: CPT

## 2022-07-28 PROCEDURE — 99395 PREV VISIT EST AGE 18-39: CPT | Mod: 25

## 2023-02-16 ENCOUNTER — APPOINTMENT (OUTPATIENT)
Dept: OBGYN | Facility: CLINIC | Age: 38
End: 2023-02-16
Payer: COMMERCIAL

## 2023-02-16 PROCEDURE — 76830 TRANSVAGINAL US NON-OB: CPT

## 2023-02-16 PROCEDURE — 99213 OFFICE O/P EST LOW 20 MIN: CPT | Mod: 25

## 2023-12-15 NOTE — H&P PST ADULT - VENOUS THROMBOEMBOLISM HISTORY
"Christa Ramirez is a 67 y.o. female here for a non-provider visit for:   TDAP    Reason for immunization: Overdue/Provider Recommended  Immunization records indicate need for vaccine: Yes, confirmed with Epic  Minimum interval has been met for this vaccine: Yes  ABN completed: Not Indicated    VIS Dated  8/6/2021 was given to patient: Yes  All IAC Questionnaire questions were answered \"No.\"    Patient tolerated injection and no adverse effects were observed or reported: Yes    Pt scheduled for next dose in series: Not Indicated  " (0) indicator not present

## 2024-07-22 ENCOUNTER — APPOINTMENT (OUTPATIENT)
Dept: OBGYN | Facility: CLINIC | Age: 39
End: 2024-07-22
Payer: COMMERCIAL

## 2024-07-22 PROCEDURE — 99395 PREV VISIT EST AGE 18-39: CPT | Mod: 25

## 2024-07-22 PROCEDURE — 99459 PELVIC EXAMINATION: CPT

## 2024-07-22 PROCEDURE — 76830 TRANSVAGINAL US NON-OB: CPT

## 2024-07-22 PROCEDURE — 81002 URINALYSIS NONAUTO W/O SCOPE: CPT
